# Patient Record
Sex: MALE | Race: WHITE | NOT HISPANIC OR LATINO | URBAN - METROPOLITAN AREA
[De-identification: names, ages, dates, MRNs, and addresses within clinical notes are randomized per-mention and may not be internally consistent; named-entity substitution may affect disease eponyms.]

---

## 2020-10-13 ENCOUNTER — HOSPITAL ENCOUNTER (OUTPATIENT)
Dept: RADIOLOGY | Facility: HOSPITAL | Age: 35
Discharge: HOME | End: 2020-10-13
Attending: PAIN MEDICINE

## 2020-10-13 ENCOUNTER — HOSPITAL ENCOUNTER (OUTPATIENT)
Dept: PHYSICAL MEDICINE AND REHAB | Facility: CLINIC | Age: 35
Discharge: HOME | End: 2020-10-13
Attending: PAIN MEDICINE

## 2020-10-13 DIAGNOSIS — M79.18 MYOFASCIAL PAIN: ICD-10-CM

## 2020-10-13 DIAGNOSIS — M53.3 SACRAL BACK PAIN: ICD-10-CM

## 2020-10-13 DIAGNOSIS — M54.41 ACUTE RIGHT-SIDED LOW BACK PAIN WITH RIGHT-SIDED SCIATICA: ICD-10-CM

## 2020-10-13 RX ORDER — VARENICLINE TARTRATE 1 MG/1
1 TABLET, FILM COATED ORAL 2 TIMES DAILY
Status: SHIPPED | COMMUNITY
Start: 2020-10-13

## 2020-10-13 RX ORDER — GABAPENTIN 300 MG/1
300 CAPSULE ORAL 3 TIMES DAILY
Qty: 270 CAPSULE | Refills: 1 | Status: SHIPPED | OUTPATIENT
Start: 2020-10-13

## 2020-10-13 RX ORDER — NAPROXEN 500 MG/1
500 TABLET ORAL 2 TIMES DAILY WITH MEALS
Qty: 60 TABLET | Refills: 1 | Status: SHIPPED | OUTPATIENT
Start: 2020-10-13

## 2020-10-13 RX ORDER — FAMOTIDINE 40 MG/1
40 TABLET, FILM COATED ORAL
Status: SHIPPED | COMMUNITY
Start: 2020-07-20

## 2020-10-13 RX ORDER — CYCLOBENZAPRINE HCL 10 MG
10 TABLET ORAL 3 TIMES DAILY PRN
Qty: 90 TABLET | Refills: 3 | Status: SHIPPED | OUTPATIENT
Start: 2020-10-13

## 2020-10-13 ASSESSMENT — MIFFLIN-ST. JEOR: SCORE: 1874.73

## 2020-10-14 ENCOUNTER — COMMUNICATION - HEALTHEAST (OUTPATIENT)
Dept: PHYSICAL MEDICINE AND REHAB | Facility: CLINIC | Age: 35
End: 2020-10-14

## 2020-10-19 ENCOUNTER — COMMUNICATION - HEALTHEAST (OUTPATIENT)
Dept: PHYSICAL MEDICINE AND REHAB | Facility: CLINIC | Age: 35
End: 2020-10-19

## 2021-06-05 VITALS — BODY MASS INDEX: 29.78 KG/M2 | WEIGHT: 208 LBS | HEIGHT: 70 IN

## 2021-06-12 NOTE — TELEPHONE ENCOUNTER
Central PA team  [phone]  Pool: HE PA MED ([id number])          PA has been initiated.       PA form completed and faxed insurance via Cover My Meds     Key:  [id number]      Medication:  Gabapentin 300MG capsules      Insurance:  [employer]         Response will be received via fax and may take up to 5-10 business days depending on plan

## 2021-06-12 NOTE — PATIENT INSTRUCTIONS - HE
Prescribed Gabapentin today, 300 mg tablets, to be titrated up to 3 tablets 3 times a day as tolerated for your nerve pain. Please follow Gabapentin dosing chart below.    Gabapentin 300mg Dosing Chart    DATE  MORNING AFTERNOON BEDTIME    Day 1 0 0 1    Day 2 0 0 1    Day 3 0 0 1    Day 4 1 0 1    Day 5 1 0 1    Day 6 1 0 1    Day 7 1 1 1    Day 8 1 1 1    Day 9 1 1 1    Day 10 1 1 2    Day 11 1 1 2    Day 12 1 1 2    Day 13 2 1 2    Day 14 2 1 2    Day 15 2 1 2    Day 16 2 2 2    Day 17 2 2 2    Day 18 2 2 2    Day 19 2 2 3    Day 20 2 2 3    Day 21 2 2 3    Day 22 3 2 3    Day 23 3 2 3    Day 24 3 2 3    Day 25 3 3 3    Day 26 3 3 3    Day 27 3 3 3     Continue medication, taking 3 capsules three times daily    Please call if you have any questions regarding how to take your medication  Clinic Phone # [phone]        Discussed the importance of core strengthening, ROM, stretching exercises with the patient and how each of these entities is important in decreasing pain.  Explained to the patient that the purpose of physical therapy is to teach the patient a home exercise program.  These exercises need to be performed every day in order to decrease pain and prevent future occurrences of pain.        Order an x-ray of your low back.  Once I reviewed the images I will have one of our nurses give you a call with results and recommendations.    I also add naproxen 500 mg that you can take twice a day as needed with food.  Please do not take ibuprofen with this.     I also ordered cyclobenzaprine 10 mg he can take up to 3 times a day as needed.    ~Please call Nurse Navigation line [phone] with any questions or concerns about your treatment plan, if symptoms worsen and you would like to be seen urgently, or if you have problems controlling bladder and bowel function.

## 2021-06-12 NOTE — TELEPHONE ENCOUNTER
----- Message from Herson Leger, DO sent at 10/14/2020  7:08 AM CDT -----  Please call the patient let him know that I reviewed x-ray of his lumbar spine.  There are no vertebral body fractures.  There is arthritis in your low back.  I recommend that you start physical therapy and follow-up as scheduled.
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
## 2021-06-12 NOTE — TELEPHONE ENCOUNTER
Call to pt with provider's results and recommendations. Pt stated understanding. Pt transferred to scheduling to make 1 week follow-up appt with Dr. Leger.

## 2021-06-12 NOTE — TELEPHONE ENCOUNTER
Prior Authorization Request  Who s requesting:  Pharmacy  Pharmacy Name and Location: [facility] [place] [address]  Medication Name: Gabapentin 300mg cap  Insurance Plan: [employer]  Insurance Member ID Number:  [id number]  CoverMyMeds Key: N/A  Informed patient that prior authorizations can take up to 10 business days for response:   N/A  Okay to leave a detailed message: Yes

## 2021-06-16 PROBLEM — M54.50 ACUTE LOW BACK PAIN: Status: ACTIVE | Noted: 2018-10-29

## 2021-06-16 PROBLEM — F32.2 MAJOR DEPRESSIVE DISORDER, SINGLE EPISODE, SEVERE WITHOUT PSYCHOTIC FEATURES (H): Status: ACTIVE | Noted: 2020-10-13

## 2021-06-16 PROBLEM — J01.90 ACUTE BACTERIAL SINUSITIS: Status: ACTIVE | Noted: 2018-10-29

## 2021-06-16 PROBLEM — K57.32 DIVERTICULITIS OF SIGMOID COLON: Status: ACTIVE | Noted: 2020-10-13

## 2021-06-16 PROBLEM — B96.89 ACUTE BACTERIAL SINUSITIS: Status: ACTIVE | Noted: 2018-10-29

## 2021-06-16 PROBLEM — E66.9 OBESITY: Status: ACTIVE | Noted: 2020-10-13

## 2021-06-16 PROBLEM — Z72.0 TOBACCO USER: Status: ACTIVE | Noted: 2020-10-13

## 2021-06-16 PROBLEM — F98.8 CHILD ATTENTION DEFICIT DISORDER: Status: ACTIVE | Noted: 2020-10-13

## 2021-06-16 PROBLEM — M94.0 TIETZE'S DISEASE: Status: ACTIVE | Noted: 2020-10-13

## 2021-06-17 NOTE — TELEPHONE ENCOUNTER
Telephone Encounter by Isabella Menendez at 10/21/2020  9:50 AM     Author: Isabella Menendez Service: -- Author Type: --    Filed: 10/21/2020  9:50 AM Encounter Date: 10/19/2020 Status: Signed    : Isabella Menendez APPROVED:    Approval start date: 9/20/2020  Approval end date:  10/20/2021    Pharmacy has been notified of approval and will contact patient when medication is ready for pickup.